# Patient Record
Sex: FEMALE | Race: OTHER | Employment: OTHER | URBAN - METROPOLITAN AREA
[De-identification: names, ages, dates, MRNs, and addresses within clinical notes are randomized per-mention and may not be internally consistent; named-entity substitution may affect disease eponyms.]

---

## 2018-01-24 ENCOUNTER — ESTABLISHED COMPREHENSIVE EXAM (OUTPATIENT)
Dept: URBAN - METROPOLITAN AREA CLINIC 46 | Facility: CLINIC | Age: 83
End: 2018-01-24

## 2018-01-24 DIAGNOSIS — H26.493: ICD-10-CM

## 2018-01-24 DIAGNOSIS — H40.1231: ICD-10-CM

## 2018-01-24 DIAGNOSIS — Z96.1: ICD-10-CM

## 2018-01-24 DIAGNOSIS — H35.3131: ICD-10-CM

## 2018-01-24 PROCEDURE — G8427 DOCREV CUR MEDS BY ELIG CLIN: HCPCS

## 2018-01-24 PROCEDURE — 4177F TALK PT/CRGVR RE AREDS PREV: CPT

## 2018-01-24 PROCEDURE — 92083 EXTENDED VISUAL FIELD XM: CPT

## 2018-01-24 PROCEDURE — 2019F DILATED MACUL EXAM DONE: CPT

## 2018-01-24 PROCEDURE — 92015 DETERMINE REFRACTIVE STATE: CPT

## 2018-01-24 PROCEDURE — 92012 INTRM OPH EXAM EST PATIENT: CPT

## 2018-01-24 PROCEDURE — 1036F TOBACCO NON-USER: CPT

## 2018-01-24 RX ORDER — BRIMONIDINE TARTRATE, TIMOLOL MALEATE 2; 5 MG/ML; MG/ML
1 SOLUTION/ DROPS OPHTHALMIC TWICE A DAY
Start: 2017-12-27

## 2018-01-24 ASSESSMENT — VISUAL ACUITY
OD_PH: 20/25
OD_SC: 20/40+1
OD_CC: 20/30-1
OS_CC: 20/30-1
OD_CC: J1+
OD_BAT: 20/80
OS_BAT: 20/100
OS_SC: 20/40-1
OS_CC: J1+

## 2018-01-24 ASSESSMENT — TONOMETRY
OD_IOP_MMHG: 09
OS_IOP_MMHG: 10

## 2018-07-18 ENCOUNTER — ESTABLISHED COMPREHENSIVE EXAM (OUTPATIENT)
Dept: URBAN - METROPOLITAN AREA CLINIC 46 | Facility: CLINIC | Age: 83
End: 2018-07-18

## 2018-07-18 DIAGNOSIS — H35.3131: ICD-10-CM

## 2018-07-18 DIAGNOSIS — H26.493: ICD-10-CM

## 2018-07-18 DIAGNOSIS — H40.1231: ICD-10-CM

## 2018-07-18 DIAGNOSIS — H04.123: ICD-10-CM

## 2018-07-18 PROCEDURE — 1036F TOBACCO NON-USER: CPT

## 2018-07-18 PROCEDURE — G8427 DOCREV CUR MEDS BY ELIG CLIN: HCPCS

## 2018-07-18 PROCEDURE — 92014 COMPRE OPH EXAM EST PT 1/>: CPT

## 2018-07-18 ASSESSMENT — VISUAL ACUITY
OD_SC: 20/40-1
OD_CC: J1
OU_CC: J1+
OS_SC: 20/40-1
OD_OTHER: FL+.25
OD_CC: 20/30-1
OD_SC: J4
OS_CC: 20/30-1
OD_BAT: 20/70
OS_CC: J1
OS_OTHER: FL+.25
OS_BAT: 20/100
OS_SC: J4

## 2018-07-18 ASSESSMENT — TONOMETRY
OD_IOP_MMHG: 10
OS_IOP_MMHG: 12

## 2018-09-05 ENCOUNTER — ESTABLISHED PATIENT (OUTPATIENT)
Dept: URBAN - METROPOLITAN AREA CLINIC 39 | Facility: CLINIC | Age: 83
End: 2018-09-05

## 2018-09-05 ENCOUNTER — SURGERY/PROCEDURE (OUTPATIENT)
Dept: URBAN - METROPOLITAN AREA SURGERY 14 | Facility: SURGERY | Age: 83
End: 2018-09-05

## 2018-09-05 VITALS — DIASTOLIC BLOOD PRESSURE: 66 MMHG | HEART RATE: 59 BPM | HEIGHT: 55 IN | SYSTOLIC BLOOD PRESSURE: 157 MMHG

## 2018-09-05 DIAGNOSIS — H26.492: ICD-10-CM

## 2018-09-05 PROCEDURE — G9903 PT SCRN TBCO ID AS NON USER: HCPCS

## 2018-09-05 PROCEDURE — G8427 DOCREV CUR MEDS BY ELIG CLIN: HCPCS

## 2018-09-05 PROCEDURE — 66821 AFTER CATARACT LASER SURGERY: CPT

## 2018-09-05 PROCEDURE — G8785 BP SCRN NO PERF AT INTERVAL: HCPCS

## 2018-09-05 PROCEDURE — 92014 COMPRE OPH EXAM EST PT 1/>: CPT

## 2018-09-05 PROCEDURE — 1036F TOBACCO NON-USER: CPT

## 2018-09-05 ASSESSMENT — VISUAL ACUITY
OD_SC: 20/30-2
OS_GLARE: 20/100
OS_SC: 20/25-1
OU_SC: 20/20-1

## 2018-09-05 ASSESSMENT — TONOMETRY
OD_IOP_MMHG: 8
OS_IOP_MMHG: 10

## 2019-01-18 ENCOUNTER — EST. PATIENT EMERGENCY (OUTPATIENT)
Dept: URBAN - METROPOLITAN AREA CLINIC 43 | Facility: CLINIC | Age: 84
End: 2019-01-18

## 2019-01-18 DIAGNOSIS — S05.01XA: ICD-10-CM

## 2019-01-18 DIAGNOSIS — H04.123: ICD-10-CM

## 2019-01-18 PROCEDURE — G8427 DOCREV CUR MEDS BY ELIG CLIN: HCPCS

## 2019-01-18 PROCEDURE — 92071 CONTACT LENS FITTING FOR TX: CPT

## 2019-01-18 PROCEDURE — 92012 INTRM OPH EXAM EST PATIENT: CPT

## 2019-01-18 PROCEDURE — 1036F TOBACCO NON-USER: CPT

## 2019-01-18 PROCEDURE — G9903 PT SCRN TBCO ID AS NON USER: HCPCS

## 2019-01-18 PROCEDURE — G8756 NO BP MEASURE DOC: HCPCS

## 2019-01-18 RX ORDER — ERYTHROMYCIN 5 MG/G: OINTMENT OPHTHALMIC TWICE A DAY

## 2019-01-18 ASSESSMENT — VISUAL ACUITY
OS_PH: 20/40
OS_SC: 20/60
OD_PH: 20/50+2
OD_SC: 20/50

## 2019-01-25 ENCOUNTER — FOLLOW UP (OUTPATIENT)
Dept: URBAN - METROPOLITAN AREA CLINIC 43 | Facility: CLINIC | Age: 84
End: 2019-01-25

## 2019-01-25 DIAGNOSIS — H04.123: ICD-10-CM

## 2019-01-25 DIAGNOSIS — S05.01XD: ICD-10-CM

## 2019-01-25 PROCEDURE — 92310F

## 2019-01-25 ASSESSMENT — VISUAL ACUITY
OD_CC: 20/50
OS_CC: 20/30
OS_PH: 20/30
OD_PH: 20/50

## 2019-07-11 ENCOUNTER — APPOINTMENT (RX ONLY)
Dept: URBAN - METROPOLITAN AREA CLINIC 134 | Facility: CLINIC | Age: 84
Setting detail: DERMATOLOGY
End: 2019-07-11

## 2019-07-11 DIAGNOSIS — L85.3 XEROSIS CUTIS: ICD-10-CM

## 2019-07-11 DIAGNOSIS — D69.2 OTHER NONTHROMBOCYTOPENIC PURPURA: ICD-10-CM

## 2019-07-11 PROBLEM — I48.91 UNSPECIFIED ATRIAL FIBRILLATION: Status: ACTIVE | Noted: 2019-07-11

## 2019-07-11 PROBLEM — I63.50 CEREBRAL INFARCTION DUE TO UNSPECIFIED OCCLUSION OR STENOSIS OF UNSPECIFIED CEREBRAL ARTERY: Status: ACTIVE | Noted: 2019-07-11

## 2019-07-11 PROBLEM — Z85.3 PERSONAL HISTORY OF MALIGNANT NEOPLASM OF BREAST: Status: ACTIVE | Noted: 2019-07-11

## 2019-07-11 PROBLEM — I10 ESSENTIAL (PRIMARY) HYPERTENSION: Status: ACTIVE | Noted: 2019-07-11

## 2019-07-11 PROBLEM — D48.5 NEOPLASM OF UNCERTAIN BEHAVIOR OF SKIN: Status: ACTIVE | Noted: 2019-07-11

## 2019-07-11 PROBLEM — K21.9 GASTRO-ESOPHAGEAL REFLUX DISEASE WITHOUT ESOPHAGITIS: Status: ACTIVE | Noted: 2019-07-11

## 2019-07-11 PROBLEM — H91.90 UNSPECIFIED HEARING LOSS, UNSPECIFIED EAR: Status: ACTIVE | Noted: 2019-07-11

## 2019-07-11 PROCEDURE — ? BIOPSY BY SHAVE METHOD

## 2019-07-11 PROCEDURE — ? COUNSELING

## 2019-07-11 PROCEDURE — 11102 TANGNTL BX SKIN SINGLE LES: CPT

## 2019-07-11 PROCEDURE — 99202 OFFICE O/P NEW SF 15 MIN: CPT | Mod: 25

## 2019-07-11 ASSESSMENT — LOCATION ZONE DERM
LOCATION ZONE: LEG
LOCATION ZONE: TRUNK
LOCATION ZONE: ARM

## 2019-07-11 ASSESSMENT — LOCATION DETAILED DESCRIPTION DERM
LOCATION DETAILED: LEFT PROXIMAL DORSAL FOREARM
LOCATION DETAILED: RIGHT MEDIAL SUPERIOR CHEST
LOCATION DETAILED: LEFT ANTERIOR PROXIMAL THIGH

## 2019-07-11 ASSESSMENT — LOCATION SIMPLE DESCRIPTION DERM
LOCATION SIMPLE: LEFT THIGH
LOCATION SIMPLE: CHEST
LOCATION SIMPLE: LEFT FOREARM

## 2019-07-11 NOTE — PROCEDURE: MIPS QUALITY
Quality 131: Pain Assessment And Follow-Up: Pain assessment using a standardized tool is documented as negative, no follow-up plan required
Quality 130: Documentation Of Current Medications In The Medical Record: Current Medications Documented
Additional Notes: Patient states pain as negative, and on a scale of 0-10 the pain level is 0.
Detail Level: Detailed
Quality 474: Zoster Vaccination Status: Shingrix Vaccination not Administered or Previously Received, Reason not Otherwise Specified

## 2019-07-11 NOTE — HPI: EVALUATION OF SKIN LESION(S)
What Type Of Note Output Would You Prefer (Optional)?: Bullet Format
How Severe Are Your Spot(S)?: moderate
Have Your Spot(S) Been Treated In The Past?: has not been treated
Hpi Title: Evaluation of a Skin Lesion
Family Member: Father

## 2019-07-11 NOTE — PROCEDURE: BIOPSY BY SHAVE METHOD
Destruction After The Procedure: No
X Size Of Lesion In Cm: 0
Biopsy Type: H and E
Was A Bandage Applied: Yes
Wound Care: Petrolatum
Anesthesia Volume In Cc (Will Not Render If 0): 0.5
Cryotherapy Text: The wound bed was treated with cryotherapy after the biopsy was performed.
Lab: [facility]
Electrodesiccation Text: The wound bed was treated with electrodesiccation after the biopsy was performed.
Lab Facility: [address] [address]
Type Of Destruction Used: Curettage
Billing Type: [employer]
Biopsy Method: Dermablade
Dressing: bandage
Post-Care Instructions: I reviewed with the patient in detail post-care instructions. Patient is to keep the biopsy site dry overnight, and then apply bacitracin twice daily until healed. Patient may apply hydrogen peroxide soaks to remove any crusting.
Hemostasis: Drysol
Depth Of Biopsy: dermis
Electrodesiccation And Curettage Text: The wound bed was treated with electrodesiccation and curettage after the biopsy was performed.
Notification Instructions: Patient will be notified of biopsy results. However, patient instructed to call the office if not contacted within 2 weeks.
Anesthesia Type: 1% lidocaine with epinephrine
Consent: Written consent was obtained and risks were reviewed including but not limited to scarring, infection, bleeding, scabbing, incomplete removal, nerve damage and allergy to anesthesia.
Silver Nitrate Text: The wound bed was treated with silver nitrate after the biopsy was performed.
Curettage Text: The wound bed was treated with curettage after the biopsy was performed.
Detail Level: Detailed

## 2019-07-22 ENCOUNTER — ESTABLISHED COMPREHENSIVE EXAM (OUTPATIENT)
Dept: URBAN - METROPOLITAN AREA CLINIC 46 | Facility: CLINIC | Age: 84
End: 2019-07-22

## 2019-07-22 DIAGNOSIS — H26.493: ICD-10-CM

## 2019-07-22 DIAGNOSIS — H40.1231: ICD-10-CM

## 2019-07-22 PROCEDURE — 92015 DETERMINE REFRACTIVE STATE: CPT

## 2019-07-22 PROCEDURE — 92014 COMPRE OPH EXAM EST PT 1/>: CPT

## 2019-07-22 ASSESSMENT — VISUAL ACUITY
OS_CC: 20/30
OD_SC: 20/40+2
OS_SC: J3
OD_CC: 20/25-1
OS_SC: 20/60
OD_SC: J10
OD_CC: J1+
OS_CC: J1+

## 2019-07-22 ASSESSMENT — TONOMETRY
OS_IOP_MMHG: 12
OD_IOP_MMHG: 12

## 2019-07-25 ENCOUNTER — APPOINTMENT (RX ONLY)
Dept: URBAN - METROPOLITAN AREA CLINIC 134 | Facility: CLINIC | Age: 84
Setting detail: DERMATOLOGY
End: 2019-07-25

## 2019-08-08 ENCOUNTER — APPOINTMENT (RX ONLY)
Dept: URBAN - METROPOLITAN AREA CLINIC 134 | Facility: CLINIC | Age: 84
Setting detail: DERMATOLOGY
End: 2019-08-08

## 2019-08-08 DIAGNOSIS — L92.8 OTHER GRANULOMATOUS DISORDERS OF THE SKIN AND SUBCUTANEOUS TISSUE: ICD-10-CM

## 2019-08-08 PROBLEM — L98.8 OTHER SPECIFIED DISORDERS OF THE SKIN AND SUBCUTANEOUS TISSUE: Status: ACTIVE | Noted: 2019-08-08

## 2019-08-08 PROCEDURE — 99212 OFFICE O/P EST SF 10 MIN: CPT

## 2019-08-08 PROCEDURE — ? COUNSELING

## 2019-08-08 ASSESSMENT — LOCATION ZONE DERM: LOCATION ZONE: ARM

## 2019-08-08 ASSESSMENT — LOCATION DETAILED DESCRIPTION DERM: LOCATION DETAILED: RIGHT DISTAL LATERAL POSTERIOR UPPER ARM

## 2019-08-08 ASSESSMENT — LOCATION SIMPLE DESCRIPTION DERM: LOCATION SIMPLE: RIGHT POSTERIOR UPPER ARM

## 2019-08-08 NOTE — PROCEDURE: MIPS QUALITY
Quality 130: Documentation Of Current Medications In The Medical Record: Current Medications Documented
Quality 474: Zoster Vaccination Status: Shingrix Vaccination not Administered or Previously Received, Reason not Otherwise Specified
Additional Notes: Patient states pain as negative, and on a scale of 0-10 the pain level is 0.
Quality 131: Pain Assessment And Follow-Up: Pain assessment using a standardized tool is documented as negative, no follow-up plan required
Detail Level: Detailed

## 2020-11-10 NOTE — PATIENT DISCUSSION
Recommended removal and discard of benign lesion today, pt. wishes to proceed, consent signed and aftercare instructions given.

## 2020-11-10 NOTE — PROCEDURE NOTE: CLINICAL
PROCEDURE NOTE: Perm Epilation Left Lower Lid. Diagnosis: Trichiasis without Entropion. Anesthesia: Local. Risks, benefits and alternatives were discussed. They understand they may need repeated treatments. Patient desires to proceed with electrocautery of aberrant lashes today. They understand they may be bruised and swollen for several days. A drop of proparacaine was instilled into the involved eye. Local anesthesia was injected in the involved eyelid. Aberrant lashes were treated using a hyfrecator and jeweler’s forceps. The patient tolerated the procedure well and left in good condition. They understand to call for any concerns. Sushilashabnam Ness PROCEDURE NOTE: Excision of Eyelid Lesion Left Lower Lid. Diagnosis: Eyelid Lesion, Benign. Anesthesia: Topical. Prep: Betadine Scrub. Risks, benefits and alternatives discussed. Patient desires to proceed with excision of growth/lesion today. A drop of proparacaine was instilled in the involved eye. A tetracaine drop was used on a cotton tipped applicator and placed on the conjunctiva. The involved area was anesthetized using 1% lidocaine with epi. The lesion was excised using mini Westcotts and forceps and discarded. The wound was cauterized to achieve hemostasis. Erythromycin ophthalmic ointment was applied. Post op instructions were given to the patient. The patient tolerated the procedure well and left in good condition. The patient understands to call us for any concerns. Nathen Ness

## 2021-07-12 NOTE — PATIENT DISCUSSION
Removed today. Educated patient will probably return and to follow with [facility] for further treatment.

## 2022-09-19 NOTE — PATIENT DISCUSSION
Caller would like to discuss an/a Return call . Writer advised caller of callback within 24-72 hours.    Patient Name: Adilene Tiwari  Caller Name: Adilene  Name of Facility:   [facility] Response: N/A  Callback Number: [phone]  Best Availability:   Can A Detailed Message Be left? yes  Fax Number:   Additional Info: Patient is states she was seen for a blood pressure check this morning. Patient is requesting to know if she can have a return to work sent to her job. Please fax to [phone].   Did you confirm the message with the caller?: yes    Thank you,  Yovana Christie     Stable.